# Patient Record
(demographics unavailable — no encounter records)

---

## 2024-10-28 NOTE — ASSESSMENT
[Wheelchair] : Wheelchair [FreeTextEntry1] : 64M s/p right tibiocalcaneal fusion w/ intramedullary jose and lateral foot wound debridement with graft application (DOS 7/19/24) -Patient seen and evaluated - 7/19 s/p right tibiocalcaneal fusion w/ intramedullary jose and lateral foot wound debridement with BTM graft application, wound to dermis medially and laterally, mild malodor, no purulence, hyperkeratotic tissue of the ankle joint circumferentially, scant serosanguinous drainage -Foot was scrubbed with chlorohexidine scrub -Continue Cipro and Doxycycline until completion of 3 months  - Applied Dilute betadine-soaked gauze followed by DSD to the ankle joint wounds -RLE cast applied with a cast cut out for a bone stimulator -Bone stimulator will be applied to RLE after authorization -Pt may WB for transfer to right foot for transfers only. -RTC 3 weeks

## 2024-10-28 NOTE — PHYSICAL EXAM
[Please See PDF for [employer]] : Please See PDF for [employer]. [de-identified] : There is still hindfoot mobility noted at the fusion site.  The foot is medially oriented on the leg with no varus [de-identified] : 12/1 s/p right foot removal external fixator and removal of distal tibial screws w kerecis graft giovana: right ankle medial and lateral malleolar wounds. Right ankle medial malleolar wound to bone, hypergranular wound bed, no drainage, no acute signs of infection. Left ankle lateral malleolar wound to subq, hyperkeratotic periwound, no acute signs of infection.

## 2024-10-28 NOTE — PHYSICAL EXAM
[Please See PDF for [employer]] : Please See PDF for [employer]. [de-identified] : There is still hindfoot mobility noted at the fusion site.  The foot is medially oriented on the leg with no varus [de-identified] : 12/1 s/p right foot removal external fixator and removal of distal tibial screws w kerecis graft giovana: right ankle medial and lateral malleolar wounds. Right ankle medial malleolar wound to bone, hypergranular wound bed, no drainage, no acute signs of infection. Left ankle lateral malleolar wound to subq, hyperkeratotic periwound, no acute signs of infection.

## 2024-10-28 NOTE — HISTORY OF PRESENT ILLNESS
[Cast] : a cast [Other: ___] : [unfilled] [FreeTextEntry1] : 64M with PMHx DM, CAD, PAD, CKD seen 13 weeks s/p right tibiocalcaneal fusion w/ intramedullary jose and lateral foot wound debridement with BTM graft application on 7/19/24. Patient is in a wheelchair with a cast to the E. He has not gotten his bone stimulator. Denies any nausea, vomiting, and fevers.   10/21 FBS: 115 mg/dl Jan 2024 A1c 5.7%

## 2024-11-25 NOTE — ASSESSMENT
[Wheelchair] : Wheelchair [FreeTextEntry1] : 64M s/p right TC fusion w/ intramedullary jose and lateral foot wound debridement with graft application (DOS 7/19/24) - Patient seen and evaluated - 7/19 s/p right TC fusion w/ intramedullary jose and lateral foot wound debridement with BTM graft application, lateral wound now closed, punctate wound to dermis medially, no malodor, no purulence, hyperkeratotic tissue of the ankle joint circumferentially, scant serosanguinous drainage - c/w Doxycycline PO - Bone stimulator will be applied to RLE after authorization - Rx for custom compression stockings, measured today.  - Applied Iodosorb to wound and Unna boot until compression stockings received  - Pt may WB for transfer to right foot for transfers only. - RTC 2 weeks

## 2024-11-25 NOTE — HISTORY OF PRESENT ILLNESS
[Cast] : a cast [Other: ___] : [unfilled] [FreeTextEntry1] : 64M with PMHx DM, CAD, PAD, CKD seen 13 weeks s/p right tibiocalcaneal fusion w/ intramedullary jose and lateral foot wound debridement with BTM graft application on 7/19/24. Patient is in a wheelchair with a compression dressing to the RLE. He has not gotten his bone stimulator. Denies any N/V/F/SOB  11/25 FBS: 109 mg/dl Jan 2024 A1c 5.7%

## 2024-11-25 NOTE — PHYSICAL EXAM
[Please See PDF for [employer]] : Please See PDF for [employer]. [de-identified] : There is still hindfoot mobility noted at the fusion site.  The foot is medially oriented on the leg with no varus [de-identified] : 12/1 s/p right foot removal external fixator and removal of distal tibial screws w kerecis graft giovana: right ankle medial and lateral malleolar wounds. Right ankle medial malleolar wound to bone, hypergranular wound bed, no drainage, no acute signs of infection. Left ankle lateral malleolar wound to subq, hyperkeratotic periwound, no acute signs of infection.

## 2024-12-09 NOTE — ASSESSMENT
[Wheelchair] : Wheelchair [FreeTextEntry1] : 64M s/p right TC fusion w/ intramedullary jose and lateral foot wound debridement with graft application (DOS 7/19/24) - Patient seen and evaluated - 7/19 s/p right TC fusion w/ intramedullary jose and lateral foot wound debridement with BTM graft application, lateral wound now closed, punctate wound to dermis medially, no malodor, no purulence, hyperkeratotic tissue of the ankle joint circumferentially, scant serosanguinous drainage - c/w Doxycycline PO -   unna boot and adapter for the exogen Bone stimulator applied to RLE: pt to use 1x day/20 mins session. Instructions given on its use. - custom compression stockings pending, pt has already been measured and stocking ordered - Applied Iodosorb to wound and Unna boot until compression stockings received  - Pt may WB for transfers to right foot for transfers only. - f/u in 2 weeks- pt already has an appointment in office next Thursday.

## 2024-12-09 NOTE — HISTORY OF PRESENT ILLNESS
[Cast] : a cast [Other: ___] : [unfilled] [FreeTextEntry1] : 64M with PMHx DM, CAD, PAD, CKD seen 13 weeks s/p right tibiocalcaneal fusion w/ intramedullary jose and lateral foot wound debridement with BTM graft application on 7/19/24. Patient is in a wheelchair with a compression dressing to the RLE. He has not gotten his bone stimulator. Denies any N/V/F/SOB  12/2 FBS: 119 mg/dl Jan 2024 A1c 5.7%

## 2024-12-09 NOTE — PHYSICAL EXAM
[Please See PDF for [employer]] : Please See PDF for [employer]. [de-identified] : There is still hindfoot mobility noted at the fusion site.  The foot is medially oriented on the leg with no varus [de-identified] : 12/1 s/p right foot removal external fixator and removal of distal tibial screws w kerecis graft giovana: right ankle medial and lateral malleolar wounds. Right ankle medial malleolar wound to bone, hypergranular wound bed, no drainage, no acute signs of infection. Left ankle lateral malleolar wound to subq, hyperkeratotic periwound, no acute signs of infection.

## 2024-12-09 NOTE — PHYSICAL EXAM
[Please See PDF for [employer]] : Please See PDF for [employer]. [de-identified] : There is still hindfoot mobility noted at the fusion site.  The foot is medially oriented on the leg with no varus [de-identified] : 12/1 s/p right foot removal external fixator and removal of distal tibial screws w kerecis graft giovana: right ankle medial and lateral malleolar wounds. Right ankle medial malleolar wound to bone, hypergranular wound bed, no drainage, no acute signs of infection. Left ankle lateral malleolar wound to subq, hyperkeratotic periwound, no acute signs of infection.

## 2024-12-23 NOTE — ASSESSMENT
[Wheelchair] : Wheelchair [FreeTextEntry1] : 64M s/p right TC fusion w/ intramedullary jose and lateral foot wound debridement with graft application (DOS 7/19/24) - Patient seen and evaluated - 7/19 s/p right TC fusion w/ intramedullary jose and lateral foot wound debridement with BTM graft application, lateral wound now closed, punctate wound to dermis medially, no malodor, no purulence, hyperkeratotic tissue of the ankle joint circumferentially, scant serosanguinous drainage - c/w Doxycycline PO - Applied aquacel to wound and Unna compression dressing -  Unna boot and adapter for the exogen Bone stimulator applied to RLE: pt to use 1x day/20 mins session. Instructions given on its use. - c/w bone stimulator  - Pt may WB for transfers to right foot for transfers only. - CT ordered for RLE - This patient has a diagnosis of right foot charcot arthropathy. pt is ambulatory/ pt requires a custom ankle foot orthosis with prosthetic socket and montalvo bottom to unweight distal components of foot, stabilize intra articular surfaces in both frontal and sagittal planes and low density lining  to protect prominent gus areas of effected skin. patient will need the device for a term of greater than 9m months. no reasonable prefabricated orthosis exists.  - Based on the CT will determine if there is need for STEPHANIE. - RTC in 2 weeks

## 2024-12-23 NOTE — HISTORY OF PRESENT ILLNESS
[Cast] : a cast [Other: ___] : [unfilled] [FreeTextEntry1] : 65M with PMHx DM, CAD, PAD, CKD is s/p right tibiocalcaneal fusion with intramedullary jose and lateral foot wound debridement with BTM graft application (DOS 7/19/24). Patient ambulates with a rollator with a compression dressing to the RLE. He has been compliant with using his bone stimulator x 2 weeks. Denies any N/V/F/SOB.  12/23 FBS: 99 mg/dl October 2024 HgbA1c: 6%

## 2024-12-23 NOTE — PHYSICAL EXAM
[Please See PDF for [employer]] : Please See PDF for [employer]. [de-identified] : There is still hindfoot mobility noted at the fusion site.  The foot is medially oriented on the leg with no varus [de-identified] : 12/1 s/p right foot removal external fixator and removal of distal tibial screws w kerecis graft giovana: right ankle medial and lateral malleolar wounds. Right ankle medial malleolar wound to bone, hypergranular wound bed, no drainage, no acute signs of infection. Left ankle lateral malleolar wound to subq, hyperkeratotic periwound, no acute signs of infection.

## 2025-01-13 NOTE — PHYSICAL EXAM
[Please See PDF for [employer]] : Please See PDF for [employer]. [de-identified] : There is still hindfoot mobility noted at the fusion site.  The foot is medially oriented on the leg with no varus [de-identified] : 12/1 s/p right foot removal external fixator and removal of distal tibial screws w kerecis graft giovana: right ankle medial and lateral malleolar wounds. Right ankle medial malleolar wound to bone, hypergranular wound bed, no drainage, no acute signs of infection. Left ankle lateral malleolar wound to subq, hyperkeratotic periwound, no acute signs of infection.

## 2025-01-13 NOTE — ASSESSMENT
[Wheelchair] : Wheelchair [FreeTextEntry1] : 64M s/p right TC fusion w/ intramedullary jose and lateral foot wound debridement with graft application (DOS 7/19/24) - Patient seen and evaluated - 1/2/25: WBC 6.84, ESR 40, CRP <0.3 - 7/19 s/p right TC fusion w/ intramedullary jose and lateral foot wound debridement with BTM graft application, lateral wound now closed, punctate wound to dermis medially, no malodor, no purulence, hyperkeratotic tissue of the ankle joint circumferentially, scant serosanguinous drainage -Left leg distal anterior and posterior wounds to dermis, no erythema, scant serous drainage, no acute signs of infection. - Pt to stop the Doxycycline PO until bone biopsy is completed - Labs reviewed with the pt 1/2/25 - 12/26/24 CT reviewed with the patient: 1. Tibiotalar/subtalar arthrodesis hardware is again seen with Charcot changes of the ankle and midfoot. Talus is likely absent. 2. There is increased fragmentation of the talar head and navicular. 3. There is no osseous fusion of the pseudoarticulation between the talus and calcaneus and 0.4-0.5 cm lucency is noted around the intramedullary jose adjacent to the pseudoarticulation. This could reflect loosening and/or infection in the appropriate setting. 4. Ill-defined peripherally enhancing induration involves the plantar soft tissues at the level of the cuboid, this may involve the plantar fascia and suggests adventitial bursa formation versus phlegmon/developing abscess. - 1/13 s/p bedside right ankle bone biopsy. Specimens sent to microbiology and pathology - Applied aquacel to wound folllowed by DSD - RLE total contact cast applied - Pt may WB for transfers to right foot for transfers only. - This patient has a diagnosis of right foot charcot arthropathy. pt is ambulatory/ pt requires a custom ankle foot orthosis with prosthetic socket and montalvo bottom to unweight distal components of foot, stabilize intra articular surfaces in both frontal and sagittal planes and low density lining  to protect prominent gus areas of effected skin. patient will need the device for a term of greater than 9m months. no reasonable prefabricated orthosis exists.  - Discussed with patient that based on biopsy results, may require a R lower extremity removal of hardware - RTC in 2 week for cast application and follow up from bone biopsy

## 2025-01-13 NOTE — PHYSICAL EXAM
[Please See PDF for [employer]] : Please See PDF for [employer]. [de-identified] : There is still hindfoot mobility noted at the fusion site.  The foot is medially oriented on the leg with no varus [de-identified] : 12/1 s/p right foot removal external fixator and removal of distal tibial screws w kerecis graft giovana: right ankle medial and lateral malleolar wounds. Right ankle medial malleolar wound to bone, hypergranular wound bed, no drainage, no acute signs of infection. Left ankle lateral malleolar wound to subq, hyperkeratotic periwound, no acute signs of infection.

## 2025-01-13 NOTE — PHYSICAL EXAM
[Please See PDF for [employer]] : Please See PDF for [employer]. [de-identified] : There is still hindfoot mobility noted at the fusion site.  The foot is medially oriented on the leg with no varus [de-identified] : 12/1 s/p right foot removal external fixator and removal of distal tibial screws w kerecis graft giovana: right ankle medial and lateral malleolar wounds. Right ankle medial malleolar wound to bone, hypergranular wound bed, no drainage, no acute signs of infection. Left ankle lateral malleolar wound to subq, hyperkeratotic periwound, no acute signs of infection.

## 2025-01-13 NOTE — HISTORY OF PRESENT ILLNESS
[Cast] : a cast [Other: ___] : [unfilled] [FreeTextEntry1] : 65M with PMHx DM, CAD, PAD, CKD is s/p right tibiocalcaneal fusion with intramedullary jose and lateral foot wound debridement with BTM graft application (DOS 7/19/24). Patient ambulates with a rollator with a compression dressing to the RLE. He has been compliant with using his bone stimulator x 2 weeks. States that he discontinued antitbiotics prior to visit today. Denies any N/V/F/SOB.  1/13 - Pt presents with TTC on the right lower extremity. Pt recently had a CT scan showing possible abscess vs OM. Denies fever, chills, N/V.  1/13 FBS: 117 mg/dl October 2024 HgbA1c: 6%

## 2025-01-13 NOTE — HISTORY OF PRESENT ILLNESS
[Cast] : a cast [Other: ___] : [unfilled] [FreeTextEntry1] : 65M with PMHx DM, CAD, PAD, CKD is s/p right tibiocalcaneal fusion with intramedullary jose and lateral foot wound debridement with BTM graft application (DOS 7/19/24). Patient ambulates with a rollator with a compression dressing to the RLE. He has been compliant with using his bone stimulator x 2 weeks. Denies any N/V/F/SOB.  1/6 FBS: 118 mg/dl October 2024 HgbA1c: 6%

## 2025-01-13 NOTE — ASSESSMENT
[Wheelchair] : Wheelchair [FreeTextEntry1] : 64M s/p right TC fusion w/ intramedullary jose and lateral foot wound debridement with graft application (DOS 7/19/24) - Patient seen and evaluated - 1/2/25: WBC 6.84, ESR 40, CRP <0.3 - 7/19 s/p right TC fusion w/ intramedullary jose and lateral foot wound debridement with BTM graft application, lateral wound now closed, punctate wound to dermis medially, no malodor, no purulence, hyperkeratotic tissue of the ankle joint circumferentially, scant serosanguinous drainage - Pt to stop the Doxycycline PO until bone biopsy is completed - Labs reviewed with the pt 1/2/25 - 12/26/24 CT reviewed with the patient: 1. Tibiotalar/subtalar arthrodesis hardware is again seen with Charcot changes of the ankle and midfoot. Talus is likely absent. 2. There is increased fragmentation of the talar head and navicular. 3. There is no osseous fusion of the pseudoarticulation between the talus and calcaneus and 0.4-0.5 cm lucency is noted around the intramedullary jose adjacent to the pseudoarticulation. This could reflect loosening and/or infection in the appropriate setting. 4. Ill-defined peripherally enhancing induration involves the plantar soft tissues at the level of the cuboid, this may involve the plantar fascia and suggests adventitial bursa formation versus phlegmon/developing abscess. - Applied aquacel to wound folllowed by DSD - RLE total contact cast applied - Pt may WB for transfers to right foot for transfers only. - This patient has a diagnosis of right foot charcot arthropathy. pt is ambulatory/ pt requires a custom ankle foot orthosis with prosthetic socket and montalvo bottom to unweight distal components of foot, stabilize intra articular surfaces in both frontal and sagittal planes and low density lining  to protect prominent gus areas of effected skin. patient will need the device for a term of greater than 9m months. no reasonable prefabricated orthosis exists.  - Based on the CT, will conduct a bone biopsy which will determine if there is need for STEPHANIE. - Planning Bone biopsy of right foot next visit  - RTC in 1 week

## 2025-01-27 NOTE — PHYSICAL EXAM
[Please See PDF for [employer]] : Please See PDF for [employer]. [de-identified] : There is still hindfoot mobility noted at the fusion site.  The foot is medially oriented on the leg with no varus [de-identified] : 12/1 s/p right foot removal external fixator and removal of distal tibial screws w kerecis graft giovana: right ankle medial and lateral malleolar wounds. Right ankle medial malleolar wound to bone, hypergranular wound bed, no drainage, no acute signs of infection. Left ankle lateral malleolar wound to subq, hyperkeratotic periwound, no acute signs of infection.

## 2025-01-27 NOTE — PLAN
[FreeTextEntry1] : 64M s/p right TC fusion w/ intramedullary jose and lateral foot wound debridement with graft application (DOS 7/19/24) - Patient seen and evaluated - 1/2/25: WBC 6.84, ESR 40, CRP <0.3 - 7/19 s/p right TC fusion w/ intramedullary jose and lateral foot wound debridement with BTM graft application, lateral wound now closed, punctate wound to dermis medially, no malodor, no purulence, hyperkeratotic tissue of the ankle joint circumferentially, scant serosanguinous drainage - Pt to resume Doxycycline PO  - Labs reviewed with the pt 1/2/25 - 1/13 bone biopsy pathology: no OM - 1/13 bone biopsy culture: no growth at 5 days.  - Applied aquacel to wound folllowed by DSD - RLE total contact cast applied with bone stimulator adaptor  - Pt may WB for transfers to right foot for transfers only. - RTC in 2 week for cast application with bone stimulator adaption.